# Patient Record
Sex: MALE | Race: WHITE | URBAN - METROPOLITAN AREA
[De-identification: names, ages, dates, MRNs, and addresses within clinical notes are randomized per-mention and may not be internally consistent; named-entity substitution may affect disease eponyms.]

---

## 2024-09-04 ENCOUNTER — APPOINTMENT (OUTPATIENT)
Dept: OTOLARYNGOLOGY | Facility: CLINIC | Age: 29
End: 2024-09-04
Payer: MEDICAID

## 2024-09-04 VITALS
DIASTOLIC BLOOD PRESSURE: 78 MMHG | SYSTOLIC BLOOD PRESSURE: 143 MMHG | TEMPERATURE: 98.3 F | WEIGHT: 165.2 LBS | HEIGHT: 72 IN | BODY MASS INDEX: 22.37 KG/M2

## 2024-09-04 DIAGNOSIS — H93.8X3 SENSATION OF PLUGGED EAR, BILATERAL: Primary | ICD-10-CM

## 2024-09-04 DIAGNOSIS — H61.23 BILATERAL IMPACTED CERUMEN: ICD-10-CM

## 2024-09-04 PROCEDURE — 3008F BODY MASS INDEX DOCD: CPT

## 2024-09-04 PROCEDURE — 99203 OFFICE O/P NEW LOW 30 MIN: CPT

## 2024-09-04 PROCEDURE — 1036F TOBACCO NON-USER: CPT

## 2024-09-04 PROCEDURE — 69210 REMOVE IMPACTED EAR WAX UNI: CPT

## 2024-09-04 NOTE — PROGRESS NOTES
Patient ID: Candido Del Rio is a 29 y.o. male who presents for an initial assessment of earwax removal.     PROVIDER IMPRESSIONS:  DIAGNOSES/PROBLEMS:  - Cerumen impaction of both ears   - a plugged sensation in both ears    ASSESSMENT: Candido Del Rio is a 29 y.o. male who presents for an initial encounter with symptoms and clinical findings that are consistent with bilateral cerumen impaction. Using appropriate instrumentation, impacted cerumen was successfully removed from the affected EAC(s). Reassurance provided to patient that otologic exam today revealed no evidence of acute infection/inflammation in the EAC bilaterally and that TM appears with no evidence of infection, effusion, retraction or perforation bilaterally.      PLAN:   -I counseled patient on safe interventions for cerumen management at home. Patient may wash the external ear with a cloth. I reinforced education to the patient that they should avoid using cotton tipped applicators, tissues, paper, or any rigid object in the ear canal. I explained to the patient that doing so may cause wax to be pushed back into the ear canal and stuck and also risks injury to the ear canal and ear drum.   -Follow-up: Patient may schedule for routine evaluation for the removal of cerumen impaction as needed. All questions answered to patient satisfaction.    Subjective    HPI: Candido Del Rio is a 29 y.o. male self-referred for clinical evaluation of a plugged sensation in both ears and with the request for earwax removal. States that symptoms began a few months ago. Reports that his ears feel clogged, left greater than right.   When asked about the presence of associated otologic symptoms, including diminished hearing, tinnitus, ear pain, ear drainage, ear itching, aural fullness, autophony, dizziness or vertigo, the patient admits to none. When asked about pertinent otologic history, the patient denies a history of recurrent ear infections, denies history of ear surgery,  denies history of PE tube insertion, and denies history of prolonged/traumatic loud noise exposure. The patient does not have family history of hearing loss. The patient does not insert Q-tips in the ear canals. The patient denies insertion of other foreign objects into the ear canals. Mentions that he uses ear buds frequently and feels as though earwax is being pushed to the back of the ear canal.  The patient reports prior history of cerumen impaction approximately 1 year ago. They have not been using ear drops to loosen wax immediately prior to this visit.     REVIEW OF SYSTEMS:  All other systems negative.    Objective   Physical Exam:  Right Ear: External inspection of ear with no deformity, scars, or masses. EAC is completely impacted with cerumen. Unable to visualize tympanic membrane.  Left Ear: External inspection of ear with no deformity, scars, or masses. EAC is completely impacted with cerumen. Unable to visualize tympanic membrane.     Neurologic: Cranial nerves II-XII grossly intact and symmetric bilaterally.  Head and Face:  Head: Atraumatic with no masses, lesions or scarring.  Face: Normal symmetry. No scars or deformities.  Eyes: Conjunctiva not edematous or erythematous.   Nose: External inspection of nose: No nasal lesions, lacerations or scars.   Neck: Normal appearing, symmetric, trachea midline.   Cardiovascular: Examination of peripheral vascular system shows no clubbing or cyanosis.  Respiratory: No respiratory distress increased work of breathing. Inspection of the chest with symmetric chest expansion and normal respiratory effort.  Skin: No head and neck rashes.  Lymph nodes: No adenopathy.     EAR CLEANING PROCEDURE NOTE:  Indication: Cerumen impaction  Location: bilateral ear canals  Procedure Note: The procedure was performed by the provider.  Visualization Instrument: A microscope with a #5 speculum was placed in the ear canals to visualize the ear canal debris.  Ear Cleaning  Instrument and Outcome: Using the paulino suction, a large amount of soft, yellow cerumen was removed from the impacted EAC(s).  After cleaning: TM intact bilaterally without evidence of effusion, retraction, infection, or perforation. EAC is clear bilaterally.  Patient Status: The patient tolerated the procedure well.  Complications: There were no complications.